# Patient Record
Sex: FEMALE | Race: WHITE | Employment: OTHER | URBAN - METROPOLITAN AREA
[De-identification: names, ages, dates, MRNs, and addresses within clinical notes are randomized per-mention and may not be internally consistent; named-entity substitution may affect disease eponyms.]

---

## 2017-06-28 ENCOUNTER — HOSPITAL ENCOUNTER (OUTPATIENT)
Dept: LAB | Age: 27
Discharge: HOME | End: 2017-06-28
Payer: COMMERCIAL

## 2017-06-28 PROCEDURE — 88175 CYTOPATH C/V AUTO FLUID REDO: CPT | Performed by: ADVANCED PRACTICE MIDWIFE

## 2021-09-08 ENCOUNTER — HOSPITAL ENCOUNTER (OUTPATIENT)
Dept: PREADMISSION TESTING | Age: 31
Discharge: HOME | End: 2021-09-08
Payer: COMMERCIAL

## 2021-09-08 LAB
ANION GAP SERPL CALC-SCNC: 4 MMOL/L (ref 3–18)
BUN SERPL-MCNC: 14 MG/DL (ref 7–18)
BUN/CREAT SERPL: 17 (ref 12–20)
CALCIUM SERPL-MCNC: 8.3 MG/DL (ref 8.5–10.1)
CHLORIDE SERPL-SCNC: 106 MMOL/L (ref 100–111)
CO2 SERPL-SCNC: 29 MMOL/L (ref 21–32)
CREAT SERPL-MCNC: 0.81 MG/DL (ref 0.6–1.3)
ERYTHROCYTE [DISTWIDTH] IN BLOOD BY AUTOMATED COUNT: 12.3 % (ref 11.6–14.5)
GLUCOSE SERPL-MCNC: 145 MG/DL (ref 74–99)
HCT VFR BLD AUTO: 36.2 % (ref 35–45)
HGB BLD-MCNC: 11.9 G/DL (ref 12–16)
MCH RBC QN AUTO: 28.2 PG (ref 24–34)
MCHC RBC AUTO-ENTMCNC: 32.9 G/DL (ref 31–37)
MCV RBC AUTO: 85.8 FL (ref 78–100)
PLATELET # BLD AUTO: 259 K/UL (ref 135–420)
PMV BLD AUTO: 9.1 FL (ref 9.2–11.8)
POTASSIUM SERPL-SCNC: 4.1 MMOL/L (ref 3.5–5.5)
RBC # BLD AUTO: 4.22 M/UL (ref 4.2–5.3)
SODIUM SERPL-SCNC: 139 MMOL/L (ref 136–145)
WBC # BLD AUTO: 6.4 K/UL (ref 4.6–13.2)

## 2021-09-08 PROCEDURE — 36415 COLL VENOUS BLD VENIPUNCTURE: CPT

## 2021-09-08 PROCEDURE — 85027 COMPLETE CBC AUTOMATED: CPT

## 2021-09-08 PROCEDURE — 80048 BASIC METABOLIC PNL TOTAL CA: CPT

## 2021-09-09 ENCOUNTER — HOSPITAL ENCOUNTER (OUTPATIENT)
Dept: PREADMISSION TESTING | Age: 31
Discharge: HOME | End: 2021-09-09

## 2021-09-09 VITALS — WEIGHT: 263 LBS | HEIGHT: 66 IN | BODY MASS INDEX: 42.27 KG/M2

## 2021-09-09 RX ORDER — ASCORBIC ACID 250 MG
250 TABLET ORAL DAILY
COMMUNITY

## 2021-09-09 RX ORDER — SODIUM CHLORIDE, SODIUM LACTATE, POTASSIUM CHLORIDE, CALCIUM CHLORIDE 600; 310; 30; 20 MG/100ML; MG/100ML; MG/100ML; MG/100ML
125 INJECTION, SOLUTION INTRAVENOUS CONTINUOUS
Status: CANCELLED | OUTPATIENT
Start: 2021-09-09

## 2021-09-09 RX ORDER — LEVOFLOXACIN 5 MG/ML
500 INJECTION, SOLUTION INTRAVENOUS ONCE
Status: CANCELLED | OUTPATIENT
Start: 2021-09-09 | End: 2021-09-09

## 2021-09-09 RX ORDER — GLUCOSAMINE SULFATE 1500 MG
1000 POWDER IN PACKET (EA) ORAL DAILY
COMMUNITY

## 2021-09-09 NOTE — PERIOP NOTES
Patient educated on NPO and COVID 19 protocol. Patient to have COVID 19 screening on 9/20/2021. Patient instructed as to COVID screening date and time. Patient verbally acknowledges adherence with self quarantine per COVID 19 protocol. Patient states she will have EKG week of 9/13/2021 per anesthesia guidelines. Medications reviewed. Patient advised to leave valuables at home or with a loved one. All questions and concerns answered by RN.

## 2021-09-14 ENCOUNTER — TRANSCRIBE ORDER (OUTPATIENT)
Dept: REGISTRATION | Age: 31
End: 2021-09-14

## 2021-09-14 ENCOUNTER — HOSPITAL ENCOUNTER (OUTPATIENT)
Dept: PREADMISSION TESTING | Age: 31
Discharge: HOME | End: 2021-09-14
Payer: COMMERCIAL

## 2021-09-14 DIAGNOSIS — N20.1 CALCULUS OF URETER: ICD-10-CM

## 2021-09-14 DIAGNOSIS — N20.1 CALCULUS OF URETER: Primary | ICD-10-CM

## 2021-09-14 LAB
ATRIAL RATE: 79 BPM
CALCULATED P AXIS, ECG09: 16 DEGREES
CALCULATED R AXIS, ECG10: 46 DEGREES
CALCULATED T AXIS, ECG11: 42 DEGREES
DIAGNOSIS, 93000: NORMAL
P-R INTERVAL, ECG05: 150 MS
Q-T INTERVAL, ECG07: 360 MS
QRS DURATION, ECG06: 82 MS
QTC CALCULATION (BEZET), ECG08: 412 MS
VENTRICULAR RATE, ECG03: 79 BPM

## 2021-09-14 PROCEDURE — 93005 ELECTROCARDIOGRAM TRACING: CPT

## 2021-09-20 ENCOUNTER — HOSPITAL ENCOUNTER (OUTPATIENT)
Dept: PREADMISSION TESTING | Age: 31
Discharge: HOME | End: 2021-09-20
Payer: COMMERCIAL

## 2021-09-20 PROCEDURE — U0003 INFECTIOUS AGENT DETECTION BY NUCLEIC ACID (DNA OR RNA); SEVERE ACUTE RESPIRATORY SYNDROME CORONAVIRUS 2 (SARS-COV-2) (CORONAVIRUS DISEASE [COVID-19]), AMPLIFIED PROBE TECHNIQUE, MAKING USE OF HIGH THROUGHPUT TECHNOLOGIES AS DESCRIBED BY CMS-2020-01-R: HCPCS

## 2021-09-21 LAB — SARS-COV-2, NAA: NOT DETECTED

## 2021-09-22 ENCOUNTER — ANESTHESIA EVENT (OUTPATIENT)
Dept: SURGERY | Age: 31
End: 2021-09-22

## 2021-09-22 NOTE — H&P
Urology [address] [place] [address] [address] [address]  Tel: [phone]  Fax: [phone]    Patient : Mayte Snyder   YOB: 1990   Birth Sex: Female      Current Gender: Female      Date:  08/24/2021 11:00 AM    Visit Type:   Office Visit   Assessment/Plan  # Detail Type Description    1. Assessment History of kidney stones (H83.538). Patient Plan Patient with history of stones. She has had multiple procedures. She has had intermittent flank pain and has not had any type of imaging in many years. I am going to obtain a CT scan of the abdomen pelvis stone protocol. She will then contact office for results. As long as there is no evidence of any obstructing stones or anything that needs imminent treatment she will follow-up in 3 months with 24 hour urinalysis x2 prior         2. Assessment Calculus of kidney (N20.0). Plan Orders UA In Office No Micro to be performed. Further diagnostic evaluations ordered today include(s) CT Abdomen And Pelvis W/O Contrast (Per Dx Specify No Oral Or W/Oral Contrast) to be performed. Additional Visit Information      This 27year old female presents for Kidney and/or Ureteral Stone. History of Present Illness  1. Kidney and/or Ureteral Stone   Onset was sudden. Severity level is moderate-severe. The problem is improving. Location of pain is right flank. There are no associated symptoms. Pertinent negatives include chills, constipation, diarrhea, fever and nausea. Additional information: Pt w long hx of stones began having RIGHT flank pain 1 week ago. She last had stones in 2015. Her pain then resolved Sunday. She  had PCNL RIGHT 2015, had significant bleeding.  hemopneumothorax,  Pt needs CT scan. Problem List  Problem List reviewed.      Medications (active prior to today)  Medication Instructions Start Date Stop Date Refilled Elsewhere   Vitamin D3 take 1 capsule by oral route  every day //   Y Vitamin C  //   Y   zinc  //   Y   Ovasitol 2,000 mg-50 mg oral powder packet  //   Y   magnesium  //   Y       Medication Reconciliation  Medications reconciled today. Medication Reviewed  Adherence Medication Name Sig Desc Elsewhere Status   taking as directed Vitamin C  Y Verified   taking as directed zinc  Y Verified   taking as directed Vitamin D3 take 1 capsule by oral route  every day Y Verified   taking as directed Ovasitol 2,000 mg-50 mg oral powder packet  Y Verified   taking as directed magnesium  Y Verified     Allergies  Ingredient Reaction (Severity) Medication Name Comment   CEFTRIAXONE SODIUM throat swells Rocephin      Reviewed, updated. Review of Systems  System Neg/Pos Details   Constitutional Negative Chills and Fever. ENMT Negative Ear infections and Sore throat. Eyes Negative Blurred vision, Double vision and Eye pain. Respiratory Negative Asthma, Chronic cough, Dyspnea and Wheezing. Cardio Negative Chest pain. GI Negative Constipation, Decreased appetite, Diarrhea and Nausea. Endocrine Negative Cold intolerance, Heat intolerance, Increased thirst and Weight loss. Neuro Negative Headache and Tremors. Psych Negative Anxiety and Depression. Integumentary Negative Itching skin and Rash. MS Negative Back pain and Joint pain. Hema/Lymph Negative Easy bleeding. Vital Signs   Height  Time ft in cm Last Measured Height Position   11:19 AM 5.0 6.00 167.64       Weight/BSA/BMI  Time lb oz kg Context BMI kg/m2 BSA m2   11:19 .20  122.107  43.45      Blood Pressure  Time BP mm/Hg Position Side Site Method Cuff Size   11:19 /100          Temperature/Pulse/Respiration  Time Temp F Temp C Temp Site Pulse/min Pattern Resp/ min   11:19 AM 98.40 36.89  97       Measured by  Time Measured by   11:19 AM Julian Dominguez     Physical Exam  Exam Findings Details   Constitutional Normal Well developed.    Neck Exam Normal Inspection - Normal.   Respiratory Normal Inspection - Normal.   Abdomen Normal No abdominal tenderness. Genitourinary * Pelvic deferred. Genitourinary Normal No Suprapubic tenderness. No CVA tenderness. No Flank mass   Psychiatric Normal Orientation - Oriented to time, place, person & situation. Appropriate mood and affect. Patient Education  # Patient Education   1. Kidney Stone: Care Instructions       Medications (added, continued, or stopped today)  Start Date Medication Directions PRN Status PRN Reason Instruction Stop Date    magnesium  N       Ovasitol 2,000 mg-50 mg oral powder packet  N       Vitamin C  N       Vitamin D3 take 1 capsule by oral route  every day N       zinc  N        Total time of the encounter was 37 minutes, which includes time spent face-to-face with the patient as well as non-face-to-face time personally spent by the physician and/or other qualified health care professional.      Active Patient Care Team Members  Name Contact Agency Type Support Role Relationship Active Date Inactive Date Par[facility]   Emergency Contact Spouse          Provider:    Jerel Tovar MD 08/31/2021 7:24 AM     Document generated by:  Milton Bonner 08/31/2021 07:24 AM      ----------------------------------------------------------------------------------------------------------------------------------------------------------------------      Electronically signed by Jerel Tovar MD on 08/31/2021 07:26 AM

## 2021-09-23 ENCOUNTER — HOSPITAL ENCOUNTER (OUTPATIENT)
Age: 31
Setting detail: OUTPATIENT SURGERY
Discharge: HOME | End: 2021-09-23
Attending: UROLOGY | Admitting: UROLOGY

## 2021-09-23 ENCOUNTER — APPOINTMENT (OUTPATIENT)
Dept: GENERAL RADIOLOGY | Age: 31
End: 2021-09-23
Attending: UROLOGY

## 2021-09-23 ENCOUNTER — ANESTHESIA (OUTPATIENT)
Dept: SURGERY | Age: 31
End: 2021-09-23

## 2021-09-23 VITALS
DIASTOLIC BLOOD PRESSURE: 73 MMHG | WEIGHT: 274.3 LBS | HEART RATE: 74 BPM | BODY MASS INDEX: 44.08 KG/M2 | HEIGHT: 66 IN | RESPIRATION RATE: 16 BRPM | OXYGEN SATURATION: 100 % | TEMPERATURE: 97.5 F | SYSTOLIC BLOOD PRESSURE: 121 MMHG

## 2021-09-23 LAB — HCG UR QL: NEGATIVE

## 2021-09-23 PROCEDURE — 2709999900 HC NON-CHARGEABLE SUPPLY: Performed by: UROLOGY

## 2021-09-23 PROCEDURE — 74011250636 HC RX REV CODE- 250/636: Performed by: UROLOGY

## 2021-09-23 PROCEDURE — C1769 GUIDE WIRE: HCPCS | Performed by: UROLOGY

## 2021-09-23 PROCEDURE — 77030010103 HC SEAL PRT ENDOSC OCOA -B: Performed by: UROLOGY

## 2021-09-23 PROCEDURE — 77030020782 HC GWN BAIR PAWS FLX 3M -B: Performed by: UROLOGY

## 2021-09-23 PROCEDURE — 74011250637 HC RX REV CODE- 250/637: Performed by: ANESTHESIOLOGY

## 2021-09-23 PROCEDURE — 76210000026 HC REC RM PH II 1 TO 1.5 HR: Performed by: UROLOGY

## 2021-09-23 PROCEDURE — 76010000138 HC OR TIME 0.5 TO 1 HR: Performed by: UROLOGY

## 2021-09-23 PROCEDURE — C2617 STENT, NON-COR, TEM W/O DEL: HCPCS | Performed by: UROLOGY

## 2021-09-23 PROCEDURE — C1758 CATHETER, URETERAL: HCPCS | Performed by: UROLOGY

## 2021-09-23 PROCEDURE — 74011250636 HC RX REV CODE- 250/636: Performed by: NURSE ANESTHETIST, CERTIFIED REGISTERED

## 2021-09-23 PROCEDURE — 74011000250 HC RX REV CODE- 250: Performed by: NURSE ANESTHETIST, CERTIFIED REGISTERED

## 2021-09-23 PROCEDURE — 76210000006 HC OR PH I REC 0.5 TO 1 HR: Performed by: UROLOGY

## 2021-09-23 PROCEDURE — 82360 CALCULUS ASSAY QUANT: CPT

## 2021-09-23 PROCEDURE — 76060000032 HC ANESTHESIA 0.5 TO 1 HR: Performed by: UROLOGY

## 2021-09-23 PROCEDURE — 74011000636 HC RX REV CODE- 636: Performed by: UROLOGY

## 2021-09-23 PROCEDURE — 77030040361 HC SLV COMPR DVT MDII -B: Performed by: UROLOGY

## 2021-09-23 PROCEDURE — 77030006974 HC BSKT URET RTVR BSC -C: Performed by: UROLOGY

## 2021-09-23 PROCEDURE — 81025 URINE PREGNANCY TEST: CPT

## 2021-09-23 DEVICE — URETERAL STENT
Type: IMPLANTABLE DEVICE | Site: URETER | Status: FUNCTIONAL
Brand: POLARIS™ ULTRA

## 2021-09-23 RX ORDER — OXYCODONE AND ACETAMINOPHEN 5; 325 MG/1; MG/1
1 TABLET ORAL AS NEEDED
Status: DISCONTINUED | OUTPATIENT
Start: 2021-09-23 | End: 2021-09-23 | Stop reason: HOSPADM

## 2021-09-23 RX ORDER — NALOXONE HYDROCHLORIDE 0.4 MG/ML
0.2 INJECTION, SOLUTION INTRAMUSCULAR; INTRAVENOUS; SUBCUTANEOUS AS NEEDED
Status: DISCONTINUED | OUTPATIENT
Start: 2021-09-23 | End: 2021-09-23 | Stop reason: HOSPADM

## 2021-09-23 RX ORDER — SODIUM CHLORIDE, SODIUM LACTATE, POTASSIUM CHLORIDE, CALCIUM CHLORIDE 600; 310; 30; 20 MG/100ML; MG/100ML; MG/100ML; MG/100ML
125 INJECTION, SOLUTION INTRAVENOUS CONTINUOUS
Status: DISCONTINUED | OUTPATIENT
Start: 2021-09-23 | End: 2021-09-23 | Stop reason: HOSPADM

## 2021-09-23 RX ORDER — LIDOCAINE HYDROCHLORIDE 20 MG/ML
INJECTION, SOLUTION EPIDURAL; INFILTRATION; INTRACAUDAL; PERINEURAL AS NEEDED
Status: DISCONTINUED | OUTPATIENT
Start: 2021-09-23 | End: 2021-09-23 | Stop reason: HOSPADM

## 2021-09-23 RX ORDER — DIPHENHYDRAMINE HYDROCHLORIDE 50 MG/ML
12.5 INJECTION, SOLUTION INTRAMUSCULAR; INTRAVENOUS
Status: DISCONTINUED | OUTPATIENT
Start: 2021-09-23 | End: 2021-09-23 | Stop reason: HOSPADM

## 2021-09-23 RX ORDER — LEVOFLOXACIN 5 MG/ML
500 INJECTION, SOLUTION INTRAVENOUS ONCE
Status: COMPLETED | OUTPATIENT
Start: 2021-09-23 | End: 2021-09-23

## 2021-09-23 RX ORDER — ONDANSETRON 2 MG/ML
INJECTION INTRAMUSCULAR; INTRAVENOUS AS NEEDED
Status: DISCONTINUED | OUTPATIENT
Start: 2021-09-23 | End: 2021-09-23 | Stop reason: HOSPADM

## 2021-09-23 RX ORDER — SODIUM CHLORIDE, SODIUM LACTATE, POTASSIUM CHLORIDE, CALCIUM CHLORIDE 600; 310; 30; 20 MG/100ML; MG/100ML; MG/100ML; MG/100ML
1000 INJECTION, SOLUTION INTRAVENOUS CONTINUOUS
Status: DISCONTINUED | OUTPATIENT
Start: 2021-09-23 | End: 2021-09-23 | Stop reason: HOSPADM

## 2021-09-23 RX ORDER — FENTANYL CITRATE 50 UG/ML
25 INJECTION, SOLUTION INTRAMUSCULAR; INTRAVENOUS AS NEEDED
Status: DISCONTINUED | OUTPATIENT
Start: 2021-09-23 | End: 2021-09-23 | Stop reason: HOSPADM

## 2021-09-23 RX ORDER — FLUMAZENIL 0.1 MG/ML
0.2 INJECTION INTRAVENOUS
Status: DISCONTINUED | OUTPATIENT
Start: 2021-09-23 | End: 2021-09-23 | Stop reason: HOSPADM

## 2021-09-23 RX ORDER — SCOLOPAMINE TRANSDERMAL SYSTEM 1 MG/1
1 PATCH, EXTENDED RELEASE TRANSDERMAL
Status: DISCONTINUED | OUTPATIENT
Start: 2021-09-23 | End: 2021-09-23 | Stop reason: HOSPADM

## 2021-09-23 RX ORDER — ALBUTEROL SULFATE 0.83 MG/ML
2.5 SOLUTION RESPIRATORY (INHALATION) AS NEEDED
Status: DISCONTINUED | OUTPATIENT
Start: 2021-09-23 | End: 2021-09-23 | Stop reason: HOSPADM

## 2021-09-23 RX ORDER — MIDAZOLAM HYDROCHLORIDE 1 MG/ML
INJECTION, SOLUTION INTRAMUSCULAR; INTRAVENOUS AS NEEDED
Status: DISCONTINUED | OUTPATIENT
Start: 2021-09-23 | End: 2021-09-23 | Stop reason: HOSPADM

## 2021-09-23 RX ORDER — FENTANYL CITRATE 50 UG/ML
INJECTION, SOLUTION INTRAMUSCULAR; INTRAVENOUS AS NEEDED
Status: DISCONTINUED | OUTPATIENT
Start: 2021-09-23 | End: 2021-09-23 | Stop reason: HOSPADM

## 2021-09-23 RX ORDER — GLYCOPYRROLATE 0.2 MG/ML
INJECTION INTRAMUSCULAR; INTRAVENOUS AS NEEDED
Status: DISCONTINUED | OUTPATIENT
Start: 2021-09-23 | End: 2021-09-23 | Stop reason: HOSPADM

## 2021-09-23 RX ORDER — DIPHENHYDRAMINE HYDROCHLORIDE 50 MG/ML
INJECTION, SOLUTION INTRAMUSCULAR; INTRAVENOUS AS NEEDED
Status: DISCONTINUED | OUTPATIENT
Start: 2021-09-23 | End: 2021-09-23 | Stop reason: HOSPADM

## 2021-09-23 RX ORDER — PROPOFOL 10 MG/ML
INJECTION, EMULSION INTRAVENOUS AS NEEDED
Status: DISCONTINUED | OUTPATIENT
Start: 2021-09-23 | End: 2021-09-23 | Stop reason: HOSPADM

## 2021-09-23 RX ORDER — SODIUM CHLORIDE, SODIUM LACTATE, POTASSIUM CHLORIDE, CALCIUM CHLORIDE 600; 310; 30; 20 MG/100ML; MG/100ML; MG/100ML; MG/100ML
INJECTION, SOLUTION INTRAVENOUS
Status: DISCONTINUED | OUTPATIENT
Start: 2021-09-23 | End: 2021-09-23 | Stop reason: HOSPADM

## 2021-09-23 RX ORDER — GENTAMICIN SULFATE 80 MG/50ML
80 INJECTION, SOLUTION INTRAVENOUS ONCE
Status: COMPLETED | OUTPATIENT
Start: 2021-09-23 | End: 2021-09-23

## 2021-09-23 RX ORDER — HYDROMORPHONE HYDROCHLORIDE 1 MG/ML
0.5 INJECTION, SOLUTION INTRAMUSCULAR; INTRAVENOUS; SUBCUTANEOUS
Status: DISCONTINUED | OUTPATIENT
Start: 2021-09-23 | End: 2021-09-23 | Stop reason: HOSPADM

## 2021-09-23 RX ADMIN — DIPHENHYDRAMINE HYDROCHLORIDE 25 MG: 50 INJECTION INTRAMUSCULAR; INTRAVENOUS at 10:07

## 2021-09-23 RX ADMIN — SODIUM CHLORIDE, SODIUM LACTATE, POTASSIUM CHLORIDE, AND CALCIUM CHLORIDE: 600; 310; 30; 20 INJECTION, SOLUTION INTRAVENOUS at 10:32

## 2021-09-23 RX ADMIN — MIDAZOLAM 2 MG: 1 INJECTION INTRAMUSCULAR; INTRAVENOUS at 09:50

## 2021-09-23 RX ADMIN — FENTANYL CITRATE 50 MCG: 50 INJECTION, SOLUTION INTRAMUSCULAR; INTRAVENOUS at 10:09

## 2021-09-23 RX ADMIN — SODIUM CHLORIDE, SODIUM LACTATE, POTASSIUM CHLORIDE, AND CALCIUM CHLORIDE: 600; 310; 30; 20 INJECTION, SOLUTION INTRAVENOUS at 09:51

## 2021-09-23 RX ADMIN — SODIUM CHLORIDE, SODIUM LACTATE, POTASSIUM CHLORIDE, AND CALCIUM CHLORIDE 125 ML/HR: 600; 310; 30; 20 INJECTION, SOLUTION INTRAVENOUS at 09:15

## 2021-09-23 RX ADMIN — ONDANSETRON HYDROCHLORIDE 4 MG: 2 INJECTION INTRAMUSCULAR; INTRAVENOUS at 10:31

## 2021-09-23 RX ADMIN — PROPOFOL 250 MG: 10 INJECTION, EMULSION INTRAVENOUS at 10:02

## 2021-09-23 RX ADMIN — GLYCOPYRROLATE 0.2 MG: 0.2 INJECTION INTRAMUSCULAR; INTRAVENOUS at 09:52

## 2021-09-23 RX ADMIN — LIDOCAINE HYDROCHLORIDE 100 MG: 20 INJECTION, SOLUTION INTRAVENOUS at 10:02

## 2021-09-23 RX ADMIN — FENTANYL CITRATE 50 MCG: 50 INJECTION, SOLUTION INTRAMUSCULAR; INTRAVENOUS at 10:31

## 2021-09-23 RX ADMIN — LEVOFLOXACIN 250 MG: 5 INJECTION, SOLUTION INTRAVENOUS at 09:51

## 2021-09-23 RX ADMIN — GENTAMICIN SULFATE 80 MG: 80 INJECTION, SOLUTION INTRAVENOUS at 10:15

## 2021-09-23 NOTE — INTERVAL H&P NOTE
Update History & Physical    The Patient's History and Physical of August 24, 2021  was reviewed with the patient and I examined the patient. On August 28, 2021 patient had CT scan for right flank pain revealing for stones in the lower ureter on the right side with a 6 mm stone as a leading stone with three 2 mm stones above. The surgical site was confirmed by the patient and me. Plan:  The risk, benefits, expected outcome, and alternative to the recommended procedure have been discussed with the patient. Patient understands and wants to proceed with the procedure.     Electronically signed by Nichole Forrester MD on 9/23/2021 at 9:35 AM

## 2021-09-23 NOTE — DISCHARGE INSTRUCTIONS
Kj Naranjo. Lorraine Woodward M.D. [facility]  [address] [facility], [facility]  Office: [phone]  Fax:    [phone]    PROCEDURE: Procedure(s):  CYSTOSCOPY, RIGHT RETROGRADE PYELOGRAM, RIGHT URETEROSCOPY, LASER LITHOTRIPSY WITH HOLMIUM, RIGHT STENT PLACEMENT WITH C-ARM    Notify [facility] IMMEDIATELY if any of the following occur:     You are unable to urinate. Urgency to urinate is not uncommon.  You find yourself urinating small frequent amounts associated with severe lower abdominal discomfort.  Bright red blood with clots in the urine. Some reddish urine is not uncommon and should be treated with increasing the amount of fluids you drink.  Temperature above 101.5° and / or chills.  You are nauseous and / or vomiting and you cannot hold down any fluids.  Your pain is not controlled with the pain medication prescribed. Special Considerations:      Do not drive for at least 24 hours after the procedure and until you are no longer taking narcotic pain medication and you are able to move and react without hesitation. MEDICATIONS:  Pain   [x]  Norco®   []  Percocet® []  Dilaudid®    [x] ketorolac   Antibiotics   []  Cipro   []  Keflex    [] Levaquin   [x]  Bactrim DS®       Urination   []  Vesicare®   []  Flomax     Burning   [x]  Pyridium®   []  UribelTM     Nausea   []  Zofran®   []  Phenergan®     Miscellaneous   []           [] Prescriptions Written on Chart    [x] Prescriptions sent Electronically           Our office will call you tomorrow to schedule your first follow-up appointment. Please contact [facility], [facility]. Urology at [phone] or go to the nearest Emergency Department / Urgent Care facility for any other medical questions or concerns.         DISCHARGE SUMMARY from Nurse    PATIENT INSTRUCTIONS:    After general anesthesia or intravenous sedation, for 24 hours or while taking prescription Narcotics:  · Limit your activities  · Do not drive and operate hazardous machinery  · Do not make important personal or business decisions  · Do  not drink alcoholic beverages  · If you have not urinated within 8 hours after discharge, please contact your surgeon on call. Report the following to your surgeon:  · Excessive pain, swelling, redness or odor of or around the surgical area  · Temperature over 100.5  · Nausea and vomiting lasting longer than 4 hours or if unable to take medications  · Any signs of decreased circulation or nerve impairment to extremity: change in color, persistent  numbness, tingling, coldness or increase pain  · Any questions    What to do at Home:  Recommended activity: Ambulate in house and No driving while on analgesics,     If you experience any of the following symptoms above, please follow up with Dr. Lion De La Paz. *  Please give a list of your current medications to your Primary Care Provider. *  Please update this list whenever your medications are discontinued, doses are      changed, or new medications (including over-the-counter products) are added. *  Please carry medication information at all times in case of emergency situations. These are general instructions for a healthy lifestyle:    No smoking/ No tobacco products/ Avoid exposure to second hand smoke  Surgeon General's Warning:  Quitting smoking now greatly reduces serious risk to your health. Obesity, smoking, and sedentary lifestyle greatly increases your risk for illness    A healthy diet, regular physical exercise & weight monitoring are important for maintaining a healthy lifestyle    You may be retaining fluid if you have a history of heart failure or if you experience any of the following symptoms:  Weight gain of 3 pounds or more overnight or 5 pounds in a week, increased swelling in our hands or feet or shortness of breath while lying flat in bed.   Please call your doctor as soon as you notice any of these symptoms; do not wait until your next office visit. Patient armband removed and shredded    The discharge information has been reviewed with the patient and caregiver. The patient and caregiver verbalized understanding. Discharge medications reviewed with the patient and caregiver and appropriate educational materials and side effects teaching were provided. Learning About COVID-19 and Social Distancing  What is it? Social distancing means putting space between yourself and other people. The recommended distance is 6 feet, or about 2 meters. This also means staying away from any place where people may gather, such as rader or other public gathering places. Why is it important? Social distancing is the best way to reduce the spread of COVID-19. This virus seems to spread from person to person through droplets from coughing and sneezing. So if you keep your distance from others, you're less likely to get it or spread it. And social distancing is important for everyone, not just those who are at high risk of infection, like older people. You might have the virus but not have symptoms. You could then give the infection to someone you come into contact with. How is it done? Putting 6 feet, or about 2 meters, between you and other people is the recommended distance. Also stay away from any place where people may gather, such as rader or other public gathering places. So if possible:  · Work from home, and keep your kids at home. · Don't travel if you don't have to. And avoid public transportation, ride-shares, and taxis unless you have no choice. · Limit shopping to essentials, like food and medicines. · Wear a cloth face cover if you have to go to a public place like the grocery store or pharmacy. · Don't eat in restaurants. (You can still get takeout or food deliveries.)  · Avoid crowds and busy places. Follow stay-at-home orders or other directions for your area. Where can you learn more?   Go to http://www.gray.com/  Enter A133 in the search box to learn more about \"Learning About COVID-19 and Social Distancing. \"  Current as of: December 18, 2020               Content Version: 12.8  © 6540-9032 [employer]. Care instructions adapted under license by [employer] (which disclaims liability or warranty for this information). If you have questions about a medical condition or this instruction, always ask your healthcare professional. [address] any warranty or liability for your use of this information.     ___________________________________________________________________________________________________________________________________

## 2021-09-23 NOTE — ANESTHESIA POSTPROCEDURE EVALUATION
Post-Anesthesia Evaluation and Assessment    Cardiovascular Function/Vital Signs  Visit Vitals  /74   Pulse 73   Temp 36.1 °C (97 °F)   Resp 18   Ht 5' 6\" (1.676 m)   Wt 124.4 kg (274 lb 4.8 oz)   SpO2 98%   BMI 44.27 kg/m²       Patient is status post Procedure(s):  CYSTOSCOPY, RIGHT RETROGRADE PYELOGRAM WITH INTERPRETATION, RIGHT URETEROSCOPY, LASER LITHOTRIPSY WITH HOLMIUM, RIGHT STENT PLACEMENT WITH C-ARM. Nausea/Vomiting: Controlled. Postoperative hydration reviewed and adequate. Pain:  Pain Scale 1: Numeric (0 - 10) (09/23/21 1116)  Pain Intensity 1: 0 (09/23/21 1116)   Managed. Neurological Status:   Neuro (WDL): Within Defined Limits (09/23/21 0859)   At baseline. Mental Status and Level of Consciousness: Baseline and appropriate for discharge. Pulmonary Status:   O2 Device: None (Room air) (09/23/21 1109)   Adequate oxygenation and airway patent. Complications related to anesthesia: None    Post-anesthesia assessment completed. No concerns. Patient has met all discharge requirements.     Signed By: Maria Elena Pendleton MD    September 23, 2021

## 2021-09-23 NOTE — BRIEF OP NOTE
Brief Postoperative Note    Patient: Adriana Carlin  YOB: 1990  MRN: [id number]    Date of Procedure: 9/23/2021     Pre-Op Diagnosis: RIGHT URETERAL STONES    Post-Op Diagnosis: Right ureteral stones right hydroureteronephrosis    Procedure(s):  CYSTOSCOPY, RIGHT RETROGRADE PYELOGRAM WITH INTERPRETATION, RIGHT URETEROSCOPY, LASER LITHOTRIPSY WITH HOLMIUM, RIGHT STENT PLACEMENT WITH C-ARM    Surgeon(s):  Renna Schwab, MD    Surgical Assistant: None    Anesthesia: General     Estimated Blood Loss (mL): Minimal    Complications: Patient had itching and swelling in her arm which appeared to be due to Levaquin prior to the case beginning. The patient was treated with Benadryl, Levaquin was stopped. A new IV was placed and she was given gentamicin. Specimens:   ID Type Source Tests Collected by Time Destination   1 : RIGHT KIDNEY STONES Fresh Kidney  Renna Schwab, MD 9/23/2021 [facility]        Implants:   Implant Name Type Inv.  Item Serial No.  Lot No. LRB No. Used Action   Jose Concha - [id number]  Claudia Sykes [id number] -- PERCUFLEX  [employer]WD [id number] Right 1 Implanted       Drains: * No LDAs found *    Findings: Right lower ureteral stones    Electronically Signed by Swati Muñoz MD on 9/23/2021 at 10:40 AM

## 2021-09-23 NOTE — OP NOTES
Postoperative Note    Patient: Janine Dickey  YOB: 1990  MRN: [id number]    Date of Procedure: 9/23/2021     Pre-Op Diagnosis: RIGHT URETERAL STONES    Post-Op Diagnosis: Right ureteral stones right hydroureteronephrosis    Procedure(s):  CYSTOSCOPY, RIGHT RETROGRADE PYELOGRAM WITH INTERPRETATION, RIGHT URETEROSCOPY, LASER LITHOTRIPSY WITH HOLMIUM, RIGHT STENT PLACEMENT WITH C-ARM    Surgeon(s):  Refugio Jarrell MD    Surgical Assistant: None    Anesthesia: General     Estimated Blood Loss (mL): Minimal    Complications: Patient had itching and swelling in her arm which appeared to be due to Levaquin prior to the case beginning. The patient was treated with Benadryl, Levaquin was stopped. A new IV was placed and she was given gentamicin. Specimens:   ID Type Source Tests Collected by Time Destination   1 : RIGHT KIDNEY STONES Fresh Kidney  Refugio Jarrell MD 9/23/2021 [facility]        Implants:   Implant Name Type Inv. Item Serial No.  Lot No. LRB No. Used Action   Luis Antonio Sethi - [id number]  [place] [id number] -- PERCUFLEX  [employer] [facility]_WD [id number] Right 1 Implanted       Drains: * No LDAs found *    Findings: Right lower ureteral stones  Procedure Details: The patient was brought in the operating room, placed in the   supine position. After administration of general anesthesia, she was placed   in lithotomy position. Groin and genitalia were prepped and draped in the   usual sterile fashion. I began with a 21-Nicarag[place] cystoscope. Cystourethroscopy did not reveal any abnormalities. I identified the right   ureteral orifice and intubated it with a 5-Nicaraguan open-ended catheter. I   then performed a retrograde pyelogram, which confirmed a multiple filling defects and   obstructing stones in the lower ureter. In addition there was mild to moderate proximal hydroureteronephrosis.  I then placed a 0.035 sensor wire   through the open-ended catheter up into the collecting system. Using the 6.9-Chadian semirigid ureteroscope, I traversed the ureter up stone in the UVJ. Using the holmium laser, I fragmented the and dusted the stones into multiple fragments. Then, using a 0 tip basket, I removed all these   fragments in total. There was no residual fragments or stones seen in the   ureter. Cystoscopic guidance was used to place a 5 x 26 double-J stent over   the wire. The wire was removed. There was a good coil in the kidney noted   on fluoroscopy and a good coil in the bladder. The string was brought out through the meatus and taped to the groin. The bladder was emptied. The   patient was extubated. She was transferred to the recovery room in stable   condition.

## 2021-09-23 NOTE — PERIOP NOTES
Voided upon arrival to Phase 2 - 500 ml yellow urine - urine strained with 2 small stones noted.  at bedside - Discharge instructions reviewed with patient and family. Opportunity for questions and answers given. No further questions at this time. Report to JAVI Matos RN

## 2021-09-23 NOTE — ANESTHESIA PREPROCEDURE EVALUATION
Relevant Problems   No relevant active problems       Anesthetic History     PONV          Review of Systems / Medical History  Patient summary reviewed, nursing notes reviewed and pertinent labs reviewed    Pulmonary          Undiagnosed apnea      Comments: Pt states she suspects she may have ERNESTO, but hasn't had sleep study   Neuro/Psych   Within defined limits           Cardiovascular                  Exercise tolerance: >4 METS     GI/Hepatic/Renal  Within defined limits              Endo/Other        Morbid obesity     Other Findings              Physical Exam    Airway  Mallampati: II  TM Distance: 4 - 6 cm  Neck ROM: normal range of motion   Mouth opening: Normal     Cardiovascular               Dental  No notable dental hx       Pulmonary                 Abdominal         Other Findings            Anesthetic Plan    ASA: 3  Anesthesia type: general          Induction: Intravenous  Anesthetic plan and risks discussed with: Patient

## 2021-09-23 NOTE — PERIOP NOTES
Reviewed PTA medication list with patient/caregiver and patient/caregiver denies any additional medications. Patient admits to having a responsible adult care for them at home for at least 24 hours after surgery. Patient encouraged to use gown warming system and informed that using said warming gown to regulate body temperature prior to a procedure has been shown to help reduce the risks of blood clots and infection. Patient's pharmacy of choice verified and documented in PTA medication section. Dual skin assessment & fall risk band verification completed with Alvino Simons RN.

## 2021-09-23 NOTE — PERIOP NOTES
Patient assisted to the restroom and back to chair without incident. Call bell within reach and no further needs at this time.

## 2021-09-29 LAB
CALCIUM OXALATE DIHYDRATE MFR STONE IR: 60 %
COLOR STONE: NORMAL
COM MFR STONE: 30 %
COMMENT, 519994: NORMAL
COMPOSITION, 120440: NORMAL
DISCLAIMER, STO32L: NORMAL
HYDROXYAPATITE: 10 %
PHOTO, 120675: NORMAL
PLEASE NOTE, 130422: NORMAL
SIZE STONE: NORMAL MM
SPECIMEN SOURCE: NORMAL
WT STONE: 36 MG

## 2021-10-15 ENCOUNTER — HOSPITAL ENCOUNTER (OUTPATIENT)
Dept: PREADMISSION TESTING | Age: 31
Discharge: HOME | End: 2021-10-15

## 2021-10-15 VITALS — WEIGHT: 268 LBS | HEIGHT: 66 IN | BODY MASS INDEX: 43.07 KG/M2

## 2021-10-15 RX ORDER — SODIUM CHLORIDE, SODIUM LACTATE, POTASSIUM CHLORIDE, CALCIUM CHLORIDE 600; 310; 30; 20 MG/100ML; MG/100ML; MG/100ML; MG/100ML
125 INJECTION, SOLUTION INTRAVENOUS CONTINUOUS
Status: CANCELLED | OUTPATIENT
Start: 2021-10-15

## 2021-10-15 NOTE — PERIOP NOTES
Patient educated on NPO, CHG, and COVID 19 protocol. Patient to have COVID 19 screening on 10/18/2021 between 2167-0908. Patient instructed as to COVID screening date and time. Patient verbally acknowledges adherence with self quarantine per COVID 19 protocol. Medications reviewed. Patient advised to leave valuables at home or with a loved one. All questions and concerns answered by RN.

## 2021-10-18 ENCOUNTER — HOSPITAL ENCOUNTER (OUTPATIENT)
Dept: PREADMISSION TESTING | Age: 31
Discharge: HOME | End: 2021-10-18
Payer: COMMERCIAL

## 2021-10-18 PROCEDURE — U0003 INFECTIOUS AGENT DETECTION BY NUCLEIC ACID (DNA OR RNA); SEVERE ACUTE RESPIRATORY SYNDROME CORONAVIRUS 2 (SARS-COV-2) (CORONAVIRUS DISEASE [COVID-19]), AMPLIFIED PROBE TECHNIQUE, MAKING USE OF HIGH THROUGHPUT TECHNOLOGIES AS DESCRIBED BY CMS-2020-01-R: HCPCS

## 2021-10-20 LAB — SARS-COV-2, NAA: NOT DETECTED

## 2021-10-20 NOTE — H&P
Urology [address] [place] [address] [address] [address]  Tel: [phone]  Fax: [phone]    Patient : Cheyenne Carvajal   YOB: 1990   Birth Sex: Female      Current Gender: Female      Date:  10/15/2021 7:31 PM    Visit Type:   Pre Op   Assessment/Plan  # Detail Type Description    1. Assessment Calculus of kidney (N20.0). Patient Plan Patient is status post right ureteroscopy and laser fragmentation of right ureteral stones she is now scheduled for left ureteroscopy and holmium laser fragmentation of left renal calculi. Risks including pain infection bleeding ureteral injury need for stent reviewed she understands and will proceed with surgical intervention 10/21/2021              Additional Visit Information      This 27year old female presents for Kidney and/or Ureteral Stone. History of Present Illness  1. Kidney and/or Ureteral Stone   Onset was sudden. Severity level is moderate-severe. The problem is improving. Location of pain is right flank. There are no associated symptoms. Pertinent negatives include chills, constipation, diarrhea, fever and nausea. Additional information: Pt w long hx of stones her most recent CT scan revealed multiple stones obstructing the right ureter. In addition there were multiple stones in the left kidney with the largest in the lower pole measuring 8 mm. .           On 09/23/2021 she underwent right ureteroscopy with holmium laser fragmentation of stones in the right ureter. She is now scheduled for treatment of left renal calculi on 10/21/2021.         Medications (active prior to today)  Medication Instructions Start Date Stop Date Refilled Elsewhere   Vitamin D3 take 1 capsule by oral route  every day //   Y   Vitamin C  //   Y   zinc  //   Y   Ovasitol 2,000 mg-50 mg oral powder packet  //   Y   magnesium  //   Y   Bactrim  mg-160 mg tablet take 1 tablet by oral route  every 12 hours 09/23/2021   N   ketorolac 10 mg tablet take 1 tablet by oral route  every 6 hours as needed for up to 5 days total use 09/23/2021   N   Pyridium 200 mg tablet take 1 tablet by oral route 3 times every day after meals as needed 09/23/2021   N   hydrocodone 5 mg-acetaminophen 325 mg tablet take 1 tablet by oral route  every 4 hours as needed for pain 09/23/2021   N         Allergies  Ingredient Reaction (Severity) Medication Name Comment   CEFTRIAXONE SODIUM throat swells Rocephin        Review of Systems  System Neg/Pos Details   Constitutional Negative Chills and Fever. ENMT Negative Ear infections and Sore throat. Eyes Negative Blurred vision, Double vision and Eye pain. Respiratory Negative Asthma, Chronic cough, Dyspnea and Wheezing. Cardio Negative Chest pain. GI Negative Constipation, Decreased appetite, Diarrhea and Nausea. Endocrine Negative Cold intolerance, Heat intolerance, Increased thirst and Weight loss. Neuro Negative Headache and Tremors. Psych Negative Anxiety and Depression. Integumentary Negative Itching skin and Rash. MS Negative Back pain and Joint pain. Hema/Lymph Negative Easy bleeding. Physical Exam  Exam Findings Details   Constitutional Normal Well developed. Neck Exam Normal Inspection - Normal.   Respiratory Normal Inspection - Normal.   Abdomen Normal No abdominal tenderness. Genitourinary * Pelvic deferred. Genitourinary Normal No Suprapubic tenderness. No CVA tenderness. No Flank mass   Psychiatric Normal Orientation - Oriented to time, place, person & situation. Appropriate mood and affect.      Medications (added, continued, or stopped today)  Start Date Medication Directions PRN Status PRN Reason Instruction Stop Date   09/23/2021 Bactrim  mg-160 mg tablet take 1 tablet by oral route  every 12 hours N      09/23/2021 hydrocodone 5 mg-acetaminophen 325 mg tablet take 1 tablet by oral route  every 4 hours as needed for pain N      09/23/2021 ketorolac 10 mg tablet take 1 tablet by oral route  every 6 hours as needed for up to 5 days total use N       magnesium  N       Ovasitol 2,000 mg-50 mg oral powder packet  N      09/23/2021 Pyridium 200 mg tablet take 1 tablet by oral route 3 times every day after meals as needed Y       Vitamin C  N       Vitamin D3 take 1 capsule by oral route  every day N       zinc  N          Active Patient Care Team Members  Name Contact Agency Type Support Role Relationship Active Date Inactive Date [place] [address]   Emergency Contact Spouse          Provider:    Elver Moya MD 10/20/2021 6:41 PM     Document generated by:  Sabra Bonner 10/20/2021 06:41 PM      ----------------------------------------------------------------------------------------------------------------------------------------------------------------------

## 2021-10-21 ENCOUNTER — ANESTHESIA (OUTPATIENT)
Dept: SURGERY | Age: 31
End: 2021-10-21

## 2021-10-21 ENCOUNTER — APPOINTMENT (OUTPATIENT)
Dept: GENERAL RADIOLOGY | Age: 31
End: 2021-10-21
Attending: UROLOGY

## 2021-10-21 ENCOUNTER — HOSPITAL ENCOUNTER (OUTPATIENT)
Age: 31
Setting detail: OUTPATIENT SURGERY
Discharge: HOME | End: 2021-10-21
Attending: UROLOGY | Admitting: UROLOGY

## 2021-10-21 ENCOUNTER — ANESTHESIA EVENT (OUTPATIENT)
Dept: SURGERY | Age: 31
End: 2021-10-21

## 2021-10-21 VITALS
DIASTOLIC BLOOD PRESSURE: 59 MMHG | SYSTOLIC BLOOD PRESSURE: 131 MMHG | TEMPERATURE: 97.2 F | HEIGHT: 66 IN | RESPIRATION RATE: 18 BRPM | WEIGHT: 279.1 LBS | OXYGEN SATURATION: 98 % | BODY MASS INDEX: 44.86 KG/M2 | HEART RATE: 71 BPM

## 2021-10-21 LAB — HCG UR QL: NEGATIVE

## 2021-10-21 PROCEDURE — 74011250637 HC RX REV CODE- 250/637: Performed by: ANESTHESIOLOGY

## 2021-10-21 PROCEDURE — 2709999900 HC NON-CHARGEABLE SUPPLY: Performed by: UROLOGY

## 2021-10-21 PROCEDURE — 77030040361 HC SLV COMPR DVT MDII -B: Performed by: UROLOGY

## 2021-10-21 PROCEDURE — 77030010103 HC SEAL PRT ENDOSC OCOA -B: Performed by: UROLOGY

## 2021-10-21 PROCEDURE — 76060000032 HC ANESTHESIA 0.5 TO 1 HR: Performed by: UROLOGY

## 2021-10-21 PROCEDURE — 76210000063 HC OR PH I REC FIRST 0.5 HR: Performed by: UROLOGY

## 2021-10-21 PROCEDURE — C1769 GUIDE WIRE: HCPCS | Performed by: UROLOGY

## 2021-10-21 PROCEDURE — 81025 URINE PREGNANCY TEST: CPT

## 2021-10-21 PROCEDURE — 82360 CALCULUS ASSAY QUANT: CPT

## 2021-10-21 PROCEDURE — 77030006974 HC BSKT URET RTVR BSC -C: Performed by: UROLOGY

## 2021-10-21 PROCEDURE — 74011250636 HC RX REV CODE- 250/636: Performed by: UROLOGY

## 2021-10-21 PROCEDURE — 76210000021 HC REC RM PH II 0.5 TO 1 HR: Performed by: UROLOGY

## 2021-10-21 PROCEDURE — 76010000138 HC OR TIME 0.5 TO 1 HR: Performed by: UROLOGY

## 2021-10-21 PROCEDURE — 77030020782 HC GWN BAIR PAWS FLX 3M -B: Performed by: UROLOGY

## 2021-10-21 PROCEDURE — 77030012508 HC MSK AIRWY LMA AMBU -A: Performed by: STUDENT IN AN ORGANIZED HEALTH CARE EDUCATION/TRAINING PROGRAM

## 2021-10-21 PROCEDURE — C1758 CATHETER, URETERAL: HCPCS | Performed by: UROLOGY

## 2021-10-21 PROCEDURE — 74011250636 HC RX REV CODE- 250/636: Performed by: NURSE ANESTHETIST, CERTIFIED REGISTERED

## 2021-10-21 PROCEDURE — C2617 STENT, NON-COR, TEM W/O DEL: HCPCS | Performed by: UROLOGY

## 2021-10-21 PROCEDURE — 74011250636 HC RX REV CODE- 250/636: Performed by: REGISTERED NURSE

## 2021-10-21 PROCEDURE — 74011000636 HC RX REV CODE- 636: Performed by: UROLOGY

## 2021-10-21 PROCEDURE — C1894 INTRO/SHEATH, NON-LASER: HCPCS | Performed by: UROLOGY

## 2021-10-21 PROCEDURE — 74011000250 HC RX REV CODE- 250: Performed by: REGISTERED NURSE

## 2021-10-21 DEVICE — URETERAL STENT
Type: IMPLANTABLE DEVICE | Site: URETER | Status: FUNCTIONAL
Brand: POLARIS™ ULTRA

## 2021-10-21 RX ORDER — SODIUM CHLORIDE, SODIUM LACTATE, POTASSIUM CHLORIDE, CALCIUM CHLORIDE 600; 310; 30; 20 MG/100ML; MG/100ML; MG/100ML; MG/100ML
125 INJECTION, SOLUTION INTRAVENOUS CONTINUOUS
Status: DISCONTINUED | OUTPATIENT
Start: 2021-10-21 | End: 2021-10-21 | Stop reason: HOSPADM

## 2021-10-21 RX ORDER — DEXAMETHASONE SODIUM PHOSPHATE 4 MG/ML
INJECTION, SOLUTION INTRA-ARTICULAR; INTRALESIONAL; INTRAMUSCULAR; INTRAVENOUS; SOFT TISSUE AS NEEDED
Status: DISCONTINUED | OUTPATIENT
Start: 2021-10-21 | End: 2021-10-21 | Stop reason: HOSPADM

## 2021-10-21 RX ORDER — MIDAZOLAM HYDROCHLORIDE 1 MG/ML
INJECTION, SOLUTION INTRAMUSCULAR; INTRAVENOUS AS NEEDED
Status: DISCONTINUED | OUTPATIENT
Start: 2021-10-21 | End: 2021-10-21 | Stop reason: HOSPADM

## 2021-10-21 RX ORDER — FENTANYL CITRATE 50 UG/ML
50 INJECTION, SOLUTION INTRAMUSCULAR; INTRAVENOUS
Status: DISCONTINUED | OUTPATIENT
Start: 2021-10-21 | End: 2021-10-21 | Stop reason: HOSPADM

## 2021-10-21 RX ORDER — GLYCOPYRROLATE 0.2 MG/ML
INJECTION INTRAMUSCULAR; INTRAVENOUS AS NEEDED
Status: DISCONTINUED | OUTPATIENT
Start: 2021-10-21 | End: 2021-10-21 | Stop reason: HOSPADM

## 2021-10-21 RX ORDER — LIDOCAINE HYDROCHLORIDE 20 MG/ML
INJECTION, SOLUTION EPIDURAL; INFILTRATION; INTRACAUDAL; PERINEURAL AS NEEDED
Status: DISCONTINUED | OUTPATIENT
Start: 2021-10-21 | End: 2021-10-21 | Stop reason: HOSPADM

## 2021-10-21 RX ORDER — SODIUM CHLORIDE 0.9 % (FLUSH) 0.9 %
5-40 SYRINGE (ML) INJECTION AS NEEDED
Status: DISCONTINUED | OUTPATIENT
Start: 2021-10-21 | End: 2021-10-21 | Stop reason: HOSPADM

## 2021-10-21 RX ORDER — FENTANYL CITRATE 50 UG/ML
INJECTION, SOLUTION INTRAMUSCULAR; INTRAVENOUS AS NEEDED
Status: DISCONTINUED | OUTPATIENT
Start: 2021-10-21 | End: 2021-10-21 | Stop reason: HOSPADM

## 2021-10-21 RX ORDER — PROPOFOL 10 MG/ML
INJECTION, EMULSION INTRAVENOUS AS NEEDED
Status: DISCONTINUED | OUTPATIENT
Start: 2021-10-21 | End: 2021-10-21 | Stop reason: HOSPADM

## 2021-10-21 RX ORDER — NALOXONE HYDROCHLORIDE 0.4 MG/ML
0.04 INJECTION, SOLUTION INTRAMUSCULAR; INTRAVENOUS; SUBCUTANEOUS
Status: DISCONTINUED | OUTPATIENT
Start: 2021-10-21 | End: 2021-10-21 | Stop reason: HOSPADM

## 2021-10-21 RX ORDER — SODIUM CHLORIDE 0.9 % (FLUSH) 0.9 %
5-40 SYRINGE (ML) INJECTION EVERY 8 HOURS
Status: DISCONTINUED | OUTPATIENT
Start: 2021-10-21 | End: 2021-10-21 | Stop reason: HOSPADM

## 2021-10-21 RX ORDER — SODIUM CHLORIDE, SODIUM LACTATE, POTASSIUM CHLORIDE, CALCIUM CHLORIDE 600; 310; 30; 20 MG/100ML; MG/100ML; MG/100ML; MG/100ML
50 INJECTION, SOLUTION INTRAVENOUS CONTINUOUS
Status: DISCONTINUED | OUTPATIENT
Start: 2021-10-21 | End: 2021-10-21 | Stop reason: HOSPADM

## 2021-10-21 RX ORDER — HYDROMORPHONE HYDROCHLORIDE 1 MG/ML
0.5 INJECTION, SOLUTION INTRAMUSCULAR; INTRAVENOUS; SUBCUTANEOUS AS NEEDED
Status: DISCONTINUED | OUTPATIENT
Start: 2021-10-21 | End: 2021-10-21 | Stop reason: HOSPADM

## 2021-10-21 RX ORDER — SCOLOPAMINE TRANSDERMAL SYSTEM 1 MG/1
1 PATCH, EXTENDED RELEASE TRANSDERMAL ONCE
Status: DISCONTINUED | OUTPATIENT
Start: 2021-10-21 | End: 2021-10-21 | Stop reason: HOSPADM

## 2021-10-21 RX ORDER — ONDANSETRON 2 MG/ML
INJECTION INTRAMUSCULAR; INTRAVENOUS AS NEEDED
Status: DISCONTINUED | OUTPATIENT
Start: 2021-10-21 | End: 2021-10-21 | Stop reason: HOSPADM

## 2021-10-21 RX ORDER — NALBUPHINE HYDROCHLORIDE 10 MG/ML
5 INJECTION, SOLUTION INTRAMUSCULAR; INTRAVENOUS; SUBCUTANEOUS
Status: DISCONTINUED | OUTPATIENT
Start: 2021-10-21 | End: 2021-10-21 | Stop reason: HOSPADM

## 2021-10-21 RX ORDER — DIPHENHYDRAMINE HYDROCHLORIDE 50 MG/ML
12.5 INJECTION, SOLUTION INTRAMUSCULAR; INTRAVENOUS
Status: DISCONTINUED | OUTPATIENT
Start: 2021-10-21 | End: 2021-10-21 | Stop reason: HOSPADM

## 2021-10-21 RX ORDER — GENTAMICIN SULFATE 60 MG/50ML
120 INJECTION, SOLUTION INTRAVENOUS ONCE
Status: COMPLETED | OUTPATIENT
Start: 2021-10-21 | End: 2021-10-21

## 2021-10-21 RX ORDER — METOCLOPRAMIDE HYDROCHLORIDE 5 MG/ML
INJECTION INTRAMUSCULAR; INTRAVENOUS AS NEEDED
Status: DISCONTINUED | OUTPATIENT
Start: 2021-10-21 | End: 2021-10-21 | Stop reason: HOSPADM

## 2021-10-21 RX ADMIN — LIDOCAINE HYDROCHLORIDE 60 MG: 20 INJECTION, SOLUTION INTRAVENOUS at 11:55

## 2021-10-21 RX ADMIN — MIDAZOLAM 2 MG: 1 INJECTION INTRAMUSCULAR; INTRAVENOUS at 11:53

## 2021-10-21 RX ADMIN — FENTANYL CITRATE 50 MCG: 50 INJECTION, SOLUTION INTRAMUSCULAR; INTRAVENOUS at 12:39

## 2021-10-21 RX ADMIN — DEXAMETHASONE SODIUM PHOSPHATE 8 MG: 4 INJECTION, SOLUTION INTRAMUSCULAR; INTRAVENOUS at 12:03

## 2021-10-21 RX ADMIN — METOCLOPRAMIDE 10 MG: 5 INJECTION, SOLUTION INTRAMUSCULAR; INTRAVENOUS at 12:08

## 2021-10-21 RX ADMIN — FENTANYL CITRATE 50 MCG: 50 INJECTION, SOLUTION INTRAMUSCULAR; INTRAVENOUS at 12:03

## 2021-10-21 RX ADMIN — SODIUM CHLORIDE, SODIUM LACTATE, POTASSIUM CHLORIDE, AND CALCIUM CHLORIDE 125 ML/HR: 600; 310; 30; 20 INJECTION, SOLUTION INTRAVENOUS at 09:55

## 2021-10-21 RX ADMIN — FENTANYL CITRATE 50 MCG: 50 INJECTION, SOLUTION INTRAMUSCULAR; INTRAVENOUS at 12:05

## 2021-10-21 RX ADMIN — GLYCOPYRROLATE 0.2 MG: 0.2 INJECTION INTRAMUSCULAR; INTRAVENOUS at 11:53

## 2021-10-21 RX ADMIN — PROPOFOL 200 MG: 10 INJECTION, EMULSION INTRAVENOUS at 11:59

## 2021-10-21 RX ADMIN — SODIUM CHLORIDE, SODIUM LACTATE, POTASSIUM CHLORIDE, AND CALCIUM CHLORIDE: 600; 310; 30; 20 INJECTION, SOLUTION INTRAVENOUS at 12:48

## 2021-10-21 RX ADMIN — GENTAMICIN SULFATE 120 MG: 60 INJECTION, SOLUTION INTRAVENOUS at 11:55

## 2021-10-21 RX ADMIN — ONDANSETRON HYDROCHLORIDE 4 MG: 2 INJECTION INTRAMUSCULAR; INTRAVENOUS at 12:48

## 2021-10-21 NOTE — PERIOP NOTES
Arrived to Phase 2 - alert and oriented - assisted to BR - voided 125 ml dark pink / clear urine without difficulty.   Denies c/o pain - denies nausea

## 2021-10-21 NOTE — PERIOP NOTES
Reviewed PTA medication list with patient/caregiver and patient/caregiver denies any additional medications. Patient admits to having a responsible adult care for them at home for at least 24 hours after surgery. Patient encouraged to use gown warming system and informed that using said warming gown to regulate body temperature prior to a procedure has been shown to help reduce the risks of blood clots and infection. Patient's pharmacy of choice verified and documented in PTA medication section. Dual skin assessment & fall risk band verification completed with Nora Mccloud RN.

## 2021-10-21 NOTE — PERIOP NOTES
Discharge instructions reviewed with patient and family. Opportunity for questions and answers given. No further questions at this time.    Tolerating po fluids and crackers - pt to  prescription for Keflex from [facility]

## 2021-10-21 NOTE — BRIEF OP NOTE
Brief Postoperative Note    Patient: Chava Velasco  YOB: 1990  MRN: [id number]    Date of Procedure: 10/21/2021     Pre-Op Diagnosis: KIDNEY STONES    Post-Op Diagnosis: Same as preoperative diagnosis. Procedure(s):  CYSTOSCOPY, LEFT RETROGRADE PYELOGRAM, LEFT URETEROSCOPY, LASER LITHOTRIPSY WITH HOLMIUM. LEFT STENT PLACEMENT WITH C-ARM    Surgeon(s):  John Jean MD    Surgical Assistant: None    Anesthesia: General     Estimated Blood Loss (mL): Minimal    Complications: None    Specimens:   ID Type Source Tests Collected by Time Destination   1 : LEFT KIDNEY STONE Fresh Kidney  John Jean MD 10/21/2021 1236 Pathology        Implants:   Implant Name Type Inv.  Item Serial No.  Lot No. LRB No. Used Action   STENT Kelly Shines -- [id number] [facility] - [id number]  STENT URET POLARIS ULTRA 5X24 -- [id number] [employer] [facility]_ [id number] Left 1 Implanted       Drains: * No LDAs found *    Findings: multiple small stones LK and one large 8mm stone LK    Electronically Signed by Kerri Marina MD on 10/21/2021 at 12:52 PM

## 2021-10-21 NOTE — PERIOP NOTES
TRANSFER - IN REPORT:    Verbal report received from ORN & CRNA on [facility]  being received from OR (unit) for routine post - op      Report consisted of patients Situation, Background, Assessment and   Recommendations(SBAR). Information from the following report(s) SBAR was reviewed with the receiving nurse. Opportunity for questions and clarification was provided. Assessment completed upon patients arrival to unit and care assumed.

## 2021-10-21 NOTE — INTERVAL H&P NOTE
Update History & Physical    The Patient's History and Physical of October 15,   2021 was reviewed with the patient and I examined the patient. There was no change. The surgical site was confirmed by the patient and me. Plan:  The risk, benefits, expected outcome, and alternative to the recommended procedure have been discussed with the patient. Patient understands and wants to proceed with the procedure.     Electronically signed by Nhan Costello MD on 10/21/2021 at 11:40 AM

## 2021-10-21 NOTE — OP NOTES
Postoperative Note    Patient: Janine Dickey  YOB: 1990  MRN: [id number]    Date of Procedure: 10/21/2021     Pre-Op Diagnosis: KIDNEY STONES    Post-Op Diagnosis: Same as preoperative diagnosis. Procedure(s):  CYSTOSCOPY, LEFT RETROGRADE PYELOGRAM, LEFT URETEROSCOPY, LASER LITHOTRIPSY WITH HOLMIUM. LEFT STENT PLACEMENT WITH C-ARM    Surgeon(s):  Refugio Jarrell MD    Surgical Assistant: None    Anesthesia: General     Estimated Blood Loss (mL): Minimal    Complications: None    Specimens:   ID Type Source Tests Collected by Time Destination   1 : LEFT KIDNEY STONE Fresh Kidney  Refugio Jarrell MD 10/21/2021 1236 Pathology        Implants:   Implant Name Type Inv. Item Serial No.  Lot No. LRB No. Used Action   STENT Charlyne Bucy ULTRA 5X24 -- [id number]  [address] [facility] - [id number]  STENT URET POLARIS ULTRA 5X24 -- [id number] [employer] [facility]_ [id number] Left 1 Implanted       Drains: * No LDAs found *    Findings: multiple small stones LK and one large 8mm stone LK      Procedure Details: The patient was seen in the pre-operative area. The risks, benefits, complications, alternative treatment options, and expected outcomes were again discussed with the patient. The possibilities of reaction to medication, pain, infection, bleeding, major cardiovascular event, death, damage to surrounding structures were specifically addressed. Informed consent was then obtained. The site of surgery properly noted/marked. Patient brought in the operating room and placed   in supine position. After administration of general anesthesia, he was   placed in lithotomy position. Groin and genitalia prepped and draped in the   usual sterile fashion. I began with a 21-Maori cystoscope. Cystourethroscopy was performed. I identified the LEFT ureteral orifice. I was able to intubate the ureteral orifice with an open-ended   catheter and retrograde pyelogram was performed.  There was no hydronephrosis or hydroureter identified. I then used a 0.035 sensor   wire, intubated the open-ended catheter, placed this into the collecting system. . I then used a 10-cm ureteral balloon dilator and dilated the lower   ureter. Then using a dual-lumen catheter, a second wire was placed into the   collecting system and an 13 x 15 x 36 Navigator sheath was placed over one of   the wires. The sheath and the wire were removed. So at this point, we had a   wire in place in the collecting system and the Navigator sheath. I then   used a flexible ureteroscope, traversed the ureter up to the collecting   system. Using holmium laser, I fragmented and dusted the all the stones. Using saline, I then flushed the fragments and dust out of the   collecting system. Using a 0 tip basket, I   removed the majority of these stone fragments,which will be sent to pathology for analysis. On   fluoroscopy, there was no evidence of any residual stone. The wire remained   in place. I removed the ureteroscope and the Navigator sheath under direct   vision and using cystoscopic guidance, a 5 x 24 double-J stent was placed   over the wire into the collecting system. I removed the   wire. There was a good coil in the kidney and a good coil in the bladder. The string was   brought out the meatus and secured with steri strips  The bladder was emptied. The patient tolerated the procedure well. The patient was extubated  transferred to recovery room in stable condition.            Kerri Marina MD  10/21/2021  12:55 PM

## 2021-10-21 NOTE — DISCHARGE INSTRUCTIONS
Resume pre hospital diet  Drink plenty of fluids   Ambulate in house  Take prescription as directed ( Keflex sent to your pharmacy per Dr. Duong López )   Shower tomorrow  Dr. Silvana Frankel office will call with follow up appointment    Cystoscopy: What to Expect at [address]     A cystoscopy is a procedure that lets a doctor look inside of the bladder and the urethra. The urethra is the tube that carries urine from the bladder to outside the body. The doctor uses a thin, lighted tool called a cystoscope. Your bladder is filled with fluid. This stretches the bladder so that your doctor can look closely at the inside of your bladder. After the cystoscopy, your urethra may be sore at first, and it may burn when you urinate for the first few days after the procedure. You may feel the need to urinate more often, and your urine may be pink. These symptoms should get better in 1 or 2 days. You will probably be able to go back to most of your usual activities in 1 or 2 days. This care sheet gives you a general idea about how long it will take for you to recover. But each person recovers at a different pace. Follow the steps below to get better as quickly as possible. How can you care for yourself at home? Activity    · Rest when you feel tired. Getting enough sleep will help you recover.     · Try to walk each day. Start by walking a little more than you did the day before. Bit by bit, increase the amount you walk. Walking boosts blood flow and helps prevent pneumonia and constipation.     · Avoid strenuous activities, such as bicycle riding, jogging, weight lifting, or aerobic exercise, until your doctor says it is okay.     · Ask your doctor when you can drive again.     · Most people are able to return to work within 1 or 2 days after the procedure.     · You may shower tomorrow     · Ask your doctor when it is okay for you to have sex. Diet    · You can eat your normal diet.  If your stomach is upset, try bland, low-fat foods like plain rice, broiled chicken, toast, and yogurt.     · Drink plenty of fluids (unless your doctor tells you not to). Medicines    · Take pain medicines exactly as directed. ? If the doctor gave you a prescription medicine for pain, take it as prescribed. ? If you are not taking a prescription pain medicine, ask your doctor if you can take an over-the-counter medicine.     · If you think your pain medicine is making you sick to your stomach:  ? Take your medicine after meals (unless your doctor has told you not to). ? Ask your doctor for a different pain medicine.     · If your doctor prescribed antibiotics, take them as directed. Do not stop taking them just because you feel better. You need to take the full course of antibiotics. Follow-up care is a key part of your treatment and safety. Be sure to make and go to all appointments, and call your doctor if you are having problems. It's also a good idea to know your test results and keep a list of the medicines you take. When should you call for help? Call 911 anytime you think you may need emergency care. For example, call if:    · You passed out (lost consciousness).     · You have severe trouble breathing.     · You have sudden chest pain and shortness of breath, or you cough up blood.     · You have severe belly pain. Call your doctor now or seek immediate medical care if:    · You are sick to your stomach or cannot keep fluids down.     · Your urine is still red or you see blood clots after you have urinated several times.     · You have trouble passing urine or stool, especially if you have pain or swelling in your lower belly.     · You have signs of a blood clot, such as:  ? Pain in your calf, back of the knee, thigh, or groin. ? Redness and swelling in your leg or groin.     · You develop a fever or severe chills.     · You have pain in your back just below your rib cage. This is called flank pain.    Watch closely for changes in your health, and be sure to contact your doctor if:    · You have pain or burning when you urinate. A burning feeling is normal for a day or two after the test, but call if it does not get better.     · You have a frequent urge to urinate but can pass only small amounts of urine.     · Your urine is pink, red, or cloudy, or smells bad. It is normal for the urine to have a pinkish color for a few days after the test, but call if it does not get better. Where can you learn more? Go to http://www.gray.com/  Enter C842 in the search box to learn more about \"Cystoscopy: What to Expect at Home. \"  Current as of: February 10, 2021               Content Version: 13.0  © 2006-2021 [employer]. Care instructions adapted under license by [employer] (which disclaims liability or warranty for this information). If you have questions about a medical condition or this instruction, always ask your healthcare professional. [place] [address] any warranty or liability for your use of this information. DISCHARGE SUMMARY from Nurse    PATIENT INSTRUCTIONS:    After general anesthesia or intravenous sedation, for 24 hours or while taking prescription Narcotics:  · Limit your activities  · Do not drive and operate hazardous machinery  · Do not make important personal or business decisions  · Do  not drink alcoholic beverages  · If you have not urinated within 8 hours after discharge, please contact your surgeon on call.     Report the following to your surgeon:  · Excessive pain, swelling, redness or odor of or around the surgical area  · Temperature over 100.5  · Nausea and vomiting lasting longer than 4 hours or if unable to take medications  · Any signs of decreased circulation or nerve impairment to extremity: change in color, persistent  numbness, tingling, coldness or increase pain  · Any questions    What to do at Home:  Recommended activity: Ambulate in house,     If you experience any of the following symptoms fever, chills, uncontrollable pain , active bleeding ( thick bloody urine ), difficulty urinating , nausea, vomiting, please follow up with Dr. Mateusz Bedoya. *  Please give a list of your current medications to your Primary Care Provider. *  Please update this list whenever your medications are discontinued, doses are      changed, or new medications (including over-the-counter products) are added. *  Please carry medication information at all times in case of emergency situations. These are general instructions for a healthy lifestyle:    No smoking/ No tobacco products/ Avoid exposure to second hand smoke  Surgeon General's Warning:  Quitting smoking now greatly reduces serious risk to your health. Obesity, smoking, and sedentary lifestyle greatly increases your risk for illness    A healthy diet, regular physical exercise & weight monitoring are important for maintaining a healthy lifestyle    You may be retaining fluid if you have a history of heart failure or if you experience any of the following symptoms:  Weight gain of 3 pounds or more overnight or 5 pounds in a week, increased swelling in our hands or feet or shortness of breath while lying flat in bed. Please call your doctor as soon as you notice any of these symptoms; do not wait until your next office visit. Patient armband removed and shredded    The discharge information has been reviewed with the patient and caregiver. The patient and caregiver verbalized understanding. Discharge medications reviewed with the patient and caregiver and appropriate educational materials and side effects teaching were provided. Learning About COVID-19 and Social Distancing  What is it? Social distancing means putting space between yourself and other people. The recommended distance is 6 feet, or about 2 meters.  This also means staying away from any place where people may gather, such as rader or other public gathering places. Why is it important? Social distancing is the best way to reduce the spread of COVID-19. This virus seems to spread from person to person through droplets from coughing and sneezing. So if you keep your distance from others, you're less likely to get it or spread it. And social distancing is important for everyone, not just those who are at high risk of infection, like older people. You might have the virus but not have symptoms. You could then give the infection to someone you come into contact with. How is it done? Putting 6 feet, or about 2 meters, between you and other people is the recommended distance. Also stay away from any place where people may gather, such as rader or other public gathering places. So if possible:  · Work from home, and keep your kids at home. · Don't travel if you don't have to. And avoid public transportation, ride-shares, and taxis unless you have no choice. · Limit shopping to essentials, like food and medicines. · Wear a cloth face cover if you have to go to a public place like the grocery store or pharmacy. · Don't eat in restaurants. (You can still get takeout or food deliveries.)  · Avoid crowds and busy places. Follow stay-at-home orders or other directions for your area. Where can you learn more? Go to http://www.gray.com/  Enter A133 in the search box to learn more about \"Learning About COVID-19 and Social Distancing. \"  Current as of: December 18, 2020               Content Version: 12.8  © 9192-8117 Healthwise, [facility]. Care instructions adapted under license by [employer] (which disclaims liability or warranty for this information).  If you have questions about a medical condition or this instruction, always ask your healthcare professional. [place] [address] any warranty or liability for your use of this information.     ___________________________________________________________________________________________________________________________________

## 2021-10-21 NOTE — ANESTHESIA POSTPROCEDURE EVALUATION
Post-Anesthesia Evaluation and Assessment    Cardiovascular Function/Vital Signs  Visit Vitals  BP (!) 131/59   Pulse 71   Temp 36.3 °C (97.4 °F)   Resp 14   Ht 5' 6\" (1.676 m)   Wt 126.6 kg (279 lb 1.6 oz)   SpO2 98%   BMI 45.05 kg/m²       Patient is status post Procedure(s):  CYSTOSCOPY, LEFT RETROGRADE PYELOGRAM, LEFT URETEROSCOPY, LASER LITHOTRIPSY WITH HOLMIUM. LEFT STENT PLACEMENT WITH C-ARM. Nausea/Vomiting: Controlled. Postoperative hydration reviewed and adequate. Pain:  Pain Scale 1: Numeric (0 - 10) (10/21/21 1321)  Pain Intensity 1: 0 (10/21/21 1321)   Managed. Neurological Status:   Neuro (WDL): Within Defined Limits (10/21/21 1321)   At baseline. Mental Status and Level of Consciousness: Baseline and appropriate for discharge. Pulmonary Status:   O2 Device: None (Room air) (10/21/21 1332)   Adequate oxygenation and airway patent. Complications related to anesthesia: None    Post-anesthesia assessment completed. No concerns. Patient has met all discharge requirements.     Signed By: Cecille Parsons MD    October 21, 2021

## 2021-10-21 NOTE — ANESTHESIA PREPROCEDURE EVALUATION
Relevant Problems   No relevant active problems       Anesthetic History     PONV          Review of Systems / Medical History  Patient summary reviewed, nursing notes reviewed and pertinent labs reviewed    Pulmonary  Within defined limits            Pertinent negatives: No sleep apnea and smoker  Comments: Pt states she is going to get a sleep study   Neuro/Psych              Cardiovascular                Pertinent negatives: No hypertension  Exercise tolerance: >4 METS     GI/Hepatic/Renal         Renal disease: stones    Pertinent negatives: No GERD   Endo/Other        Morbid obesity     Other Findings              Physical Exam    Airway  Mallampati: II  TM Distance: 4 - 6 cm  Neck ROM: normal range of motion        Cardiovascular    Rhythm: regular  Rate: normal         Dental  No notable dental hx       Pulmonary  Breath sounds clear to auscultation               Abdominal  GI exam deferred       Other Findings            Anesthetic Plan    ASA: 2  Anesthesia type: general          Induction: Intravenous  Anesthetic plan and risks discussed with: Patient

## 2021-10-27 LAB
CALCIUM OXALATE DIHYDRATE MFR STONE IR: 40 %
COLOR STONE: NORMAL
COM MFR STONE: 50 %
COMMENT, 519994: NORMAL
COMPOSITION, 120440: NORMAL
DISCLAIMER, STO32L: NORMAL
HYDROXYAPATITE: 10 %
PHOTO, 120675: NORMAL
PLEASE NOTE, 130422: NORMAL
SIZE STONE: NORMAL MM
SPECIMEN SOURCE: NORMAL
WT STONE: 104 MG

## (undated) DEVICE — DUAL LUMEN URETERAL CATHETER

## (undated) DEVICE — MASTISOL ADHESIVE LIQ 2/3ML

## (undated) DEVICE — BAG PRESSURE INFUSION 3 W STOPCOCK 3000 CC PREMIERPRO DISP

## (undated) DEVICE — SEAL ENDOSCP INSTR 7FR BX SELF SEAL

## (undated) DEVICE — GDWIRE 3CM FLX-TIP 0.038X150CM -- BX/5 SENSOR

## (undated) DEVICE — STRAP,POSITIONING,KNEE/BODY,FOAM,4X60": Brand: MEDLINE

## (undated) DEVICE — TRAP MUCUS SPECIMEN 40ML -- MEDICHOICE

## (undated) DEVICE — GARMENT,MEDLINE,DVT,INT,CALF,MED, GEN2: Brand: MEDLINE

## (undated) DEVICE — SOLUTION IRRIG 3000ML 0.9% SOD CHL FLX CONT 0797208] ICU MEDICAL INC]

## (undated) DEVICE — CYSTO PACK: Brand: MEDLINE INDUSTRIES, INC.

## (undated) DEVICE — STERILE LATEX POWDER-FREE SURGICAL GLOVESWITH NITRILE COATING: Brand: PROTEXIS

## (undated) DEVICE — NITINOL STONE RETRIEVAL BASKET: Brand: ZERO TIP

## (undated) DEVICE — URETERAL ACCESS SHEATH SET: Brand: NAVIGATOR HD

## (undated) DEVICE — CATHETER URET L70CM OD6FR OPN END FLEXIMA

## (undated) DEVICE — STRIP,CLOSURE,WOUND,MEDI-STRIP,1/2X4: Brand: MEDLINE